# Patient Record
Sex: MALE | Race: WHITE | NOT HISPANIC OR LATINO | URBAN - METROPOLITAN AREA
[De-identification: names, ages, dates, MRNs, and addresses within clinical notes are randomized per-mention and may not be internally consistent; named-entity substitution may affect disease eponyms.]

---

## 2019-07-03 ENCOUNTER — EMERGENCY (EMERGENCY)
Facility: HOSPITAL | Age: 24
LOS: 0 days | Discharge: HOME | End: 2019-07-03
Admitting: EMERGENCY MEDICINE
Payer: COMMERCIAL

## 2019-07-03 VITALS
TEMPERATURE: 99 F | DIASTOLIC BLOOD PRESSURE: 56 MMHG | RESPIRATION RATE: 18 BRPM | HEART RATE: 67 BPM | SYSTOLIC BLOOD PRESSURE: 113 MMHG | OXYGEN SATURATION: 99 %

## 2019-07-03 VITALS — WEIGHT: 225.09 LBS

## 2019-07-03 DIAGNOSIS — J06.9 ACUTE UPPER RESPIRATORY INFECTION, UNSPECIFIED: ICD-10-CM

## 2019-07-03 DIAGNOSIS — Z79.899 OTHER LONG TERM (CURRENT) DRUG THERAPY: ICD-10-CM

## 2019-07-03 DIAGNOSIS — R09.81 NASAL CONGESTION: ICD-10-CM

## 2019-07-03 PROCEDURE — 99283 EMERGENCY DEPT VISIT LOW MDM: CPT

## 2019-07-03 RX ORDER — FLUTICASONE PROPIONATE 50 MCG
1 SPRAY, SUSPENSION NASAL
Qty: 1 | Refills: 0
Start: 2019-07-03 | End: 2019-07-09

## 2019-07-03 NOTE — ED PROVIDER NOTE - OBJECTIVE STATEMENT
25 y/o male presents to the ED c/o "I have a sore throat, nasal congestion, cough since yesterday." no fever/ chills/ weakness

## 2019-09-12 PROBLEM — Z78.9 OTHER SPECIFIED HEALTH STATUS: Chronic | Status: ACTIVE | Noted: 2019-07-03

## 2019-09-19 ENCOUNTER — OUTPATIENT (OUTPATIENT)
Dept: OUTPATIENT SERVICES | Facility: HOSPITAL | Age: 24
LOS: 1 days | Discharge: HOME | End: 2019-09-19

## 2019-09-19 DIAGNOSIS — M54.89 OTHER DORSALGIA: ICD-10-CM

## 2019-10-01 ENCOUNTER — OUTPATIENT (OUTPATIENT)
Dept: OUTPATIENT SERVICES | Facility: HOSPITAL | Age: 24
LOS: 1 days | Discharge: HOME | End: 2019-10-01

## 2019-11-13 DIAGNOSIS — M54.89 OTHER DORSALGIA: ICD-10-CM

## 2019-12-16 ENCOUNTER — EMERGENCY (EMERGENCY)
Facility: HOSPITAL | Age: 24
LOS: 0 days | Discharge: HOME | End: 2019-12-16
Admitting: EMERGENCY MEDICINE
Payer: OTHER MISCELLANEOUS

## 2019-12-16 VITALS — HEIGHT: 70 IN | WEIGHT: 235.01 LBS

## 2019-12-16 VITALS
HEART RATE: 72 BPM | DIASTOLIC BLOOD PRESSURE: 69 MMHG | TEMPERATURE: 98 F | SYSTOLIC BLOOD PRESSURE: 122 MMHG | OXYGEN SATURATION: 99 % | RESPIRATION RATE: 18 BRPM
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
## 2019-12-16 DIAGNOSIS — M25.579 PAIN IN UNSPECIFIED ANKLE AND JOINTS OF UNSPECIFIED FOOT: ICD-10-CM
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
## 2019-12-16 DIAGNOSIS — M25.572 PAIN IN LEFT ANKLE AND JOINTS OF LEFT FOOT: ICD-10-CM
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
## 2019-12-16 PROCEDURE — 73630 X-RAY EXAM OF FOOT: CPT | Mod: 26,LT

## 2019-12-16 PROCEDURE — 73610 X-RAY EXAM OF ANKLE: CPT | Mod: 26,LT

## 2019-12-16 PROCEDURE — 99283 EMERGENCY DEPT VISIT LOW MDM: CPT

## 2019-12-16 RX ORDER — IBUPROFEN 200 MG
600 TABLET ORAL ONCE
Refills: 0 | Status: COMPLETED | OUTPATIENT
Start: 2019-12-16 | End: 2019-12-16

## 2019-12-16 RX ADMIN — Medication 600 MILLIGRAM(S): at 08:37

## 2019-12-16 NOTE — ED ADULT TRIAGE NOTE - CHIEF COMPLAINT QUOTE
"I was bringing a patient back upstairs and my ankle (left) accidently got run over by the stretcher" No fall, no other injuries

## 2019-12-16 NOTE — ED ADULT NURSE NOTE - OBJECTIVE STATEMENT
as per patient, "I was bringing a patient back upstairs and my ankle (left) accidently got run over by the stretcher" pt denies falling. no head trauma

## 2019-12-16 NOTE — ED PROVIDER NOTE - PATIENT PORTAL LINK FT
You can access the FollowMyHealth Patient Portal offered by [facility] by registering at the following website: [email or web address]. By joining [employer]’s FollowMyHealth portal, you will also be able to view your health information using other applications (apps) compatible with our system.

## 2019-12-16 NOTE — ED PROVIDER NOTE - PHYSICAL EXAMINATION
CONSTITUTIONAL: Well-appearing; well-nourished; in no apparent distress.   NECK: Supple; non-tender; no cervical lymphadenopathy.   CARDIOVASCULAR: Normal S1, S2; no murmurs, rubs, or gallops.   RESPIRATORY: Normal chest excursion with respiration; breath sounds clear and equal bilaterally; no wheezes, rhonchi, or rales.  GI/: Normal bowel sounds; non-distended; non-tender; no palpable organomegaly.   MS: No evidence of trauma or deformity. + mild ttp to L midfoot. Normal ROM in all four extremities. distal pulses are normal.   SKIN: Normal for age and race; warm; No ecchymosis, abrasions, lacerations  NEURO/PSYCH: A & O x 4; grossly unremarkable. mood and manner are appropriate. Neurovascular intact. Sensation intact. Normal gait.

## 2019-12-16 NOTE — ED PROVIDER NOTE - NS ED ROS FT
Constitutional: no fever, chills, no recent weight loss, change in appetite or malaise  Eyes: no redness/discharge/pain/vision changes  ENT: no rhinorrhea/ear pain/sore throat  Cardiac: No chest pain, SOB or edema.  Respiratory: No cough or respiratory distress  GI: No nausea, vomiting, diarrhea or abdominal pain.  MS: + pain to L ankle. no loss of ROM, no weakness  Neuro: No headache or weakness. No LOC.  Skin: No skin rash, bruising, swelling  Except as documented in the HPI, all other systems are negative.

## 2019-12-16 NOTE — ED PROVIDER NOTE - OBJECTIVE STATEMENT
24 year old M no pmhx c/o pain to L ankle. 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 Denies any other injury. Pt has been ambulating well since. No bruising/swelling, decreased sensation, paresthesias, decreased rom, prior injuries/trauma, inability to ambulate.

## 2019-12-16 NOTE — ED PROVIDER NOTE - CARE PROVIDER_API CALL
Hung Jaimes)  Orthopaedic Surgery  [address] [place] [address]  Phone: [phone]  Fax: [phone]  Follow Up Time:

## 2020-04-26 ENCOUNTER — MESSAGE (OUTPATIENT)
Age: 25
End: 2020-04-26

## 2020-05-04 ENCOUNTER — APPOINTMENT (OUTPATIENT)
Dept: DISASTER EMERGENCY | Facility: HOSPITAL | Age: 25
End: 2020-05-04

## 2020-05-05 LAB
SARS-COV-2 IGG SERPL IA-ACNC: <0.1 INDEX
SARS-COV-2 IGG SERPL QL IA: NEGATIVE

## 2020-07-26 ENCOUNTER — EMERGENCY (EMERGENCY)
Facility: HOSPITAL | Age: 25
LOS: 0 days | Discharge: HOME | End: 2020-07-26
Attending: EMERGENCY MEDICINE | Admitting: EMERGENCY MEDICINE
Payer: OTHER MISCELLANEOUS

## 2020-07-26 VITALS
RESPIRATION RATE: 18 BRPM | WEIGHT: 235.01 LBS | SYSTOLIC BLOOD PRESSURE: 130 MMHG | HEART RATE: 63 BPM | OXYGEN SATURATION: 97 % | TEMPERATURE: 97 F | HEIGHT: 70 IN | DIASTOLIC BLOOD PRESSURE: 63 MMHG
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
## 2020-07-26 DIAGNOSIS — S09.90XA UNSPECIFIED INJURY OF HEAD, INITIAL ENCOUNTER: ICD-10-CM
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
## 2020-07-26 PROCEDURE — 99283 EMERGENCY DEPT VISIT LOW MDM: CPT

## 2020-07-26 NOTE — ED PROVIDER NOTE - PROGRESS NOTE DETAILS
ATTENDING NOTE: 24 y/o M with no PMHx presents with right sided head pain x2 days. Pt is an employee at the [employer] and while moving equipment, hit the side of his head against a rack. No LOC. No nausea or vomiting. No vision or hearing changes. No numbness, weakness or tingling. Pt has been ambulating normally since the incident. On exam: CON: ao x 3, HENMT: neck supple,  SKIN: no rash, MSK: no deformities, NEURO: no gross motor or sensory deficit. PERRL, EOMI. Normal gait.CN II-XII intact. 5/5 strength sensation intact. Finger to nose normal.  Psychiatric: appropriate mood, appropriate affect. Imp: closed head injury s/p 3 days ago. Tylenol and d/c home.

## 2020-07-26 NOTE — ED PROVIDER NOTE - PHYSICAL EXAMINATION
CONSTITUTIONAL: Well-developed; well-nourished; in no acute distress.   SKIN: warm, dry  HEAD: Normocephalic; tenderness to palpation of the R. occipital region. No overlying ecchymosis or abrasions.  EYES: PERRL, EOMI, no conjunctival erythema  ENT: No nasal discharge; airway clear.  NECK: Supple; non tender.  CARD: 2+ radial pulses B/L.   EXT: Normal ROM.  No clubbing, cyanosis or edema.   LYMPH: No acute cervical adenopathy.  NEURO: Alert, oriented. CN II - XII intact. No dysdiadochokinesia. Sensation grossly intact. Normal gait.   PSYCH: Cooperative, appropriate.

## 2020-07-26 NOTE — ED ADULT TRIAGE NOTE - BP NONINVASIVE DIASTOLIC (MM HG)
Psychiatric/Behavioral: Negative for depression and suicidal ideas. PHYSICAL EXAM:  Temp 97.8 °F (36.6 °C) (Temporal)   Ht 5' 5\" (1.651 m)   Wt 122 lb (55.3 kg)   BMI 20.30 kg/m²   Physical Exam   Constitutional: No distress. HENT:   Mouth/Throat: No oropharyngeal exudate. Eyes: Left eye exhibits no discharge. No scleral icterus. Neck: No JVD present. No tracheal deviation present. No thyromegaly present. Cardiovascular: Exam reveals no gallop and no friction rub. Pulmonary/Chest: No stridor. He has no rales. Abdominal: He exhibits no distension and no mass. There is no rebound and no guarding. Musculoskeletal: He exhibits no edema. Neurological: No cranial nerve deficit. He exhibits normal muscle tone. Coordination normal.   Skin: He is not diaphoretic. No pallor. DATA:  U/A:    Lab Results   Component Value Date    COLORU YELLOW 2018    PROTEINU 100 2018    PHUR 7.5 2018    WBCUA 6-10 2018    RBCUA 21-30 2018    MUCUS Rare 2018    BACTERIA 2+ 2018    CLARITYU CLOUDY 2018    SPECGRAV 1.017 2018    LEUKOCYTESUR SMALL 2018    UROBILINOGEN 0.2 2018    BILIRUBINUR Negative 2018    BLOODU LARGE 2018    GLUCOSEU Negative 2018    AMORPHOUS 1+ 2018          Imagin. Urinary retention  His catheter was removed 200 mL of normal saline was inserted he voided at least 185 mL out he said he had an urge to void and felt like he emptied his bladder well. We'll leave catheter out he will follow-up in 2 weeks. Of course we go several hours without voiding and starts having discomfort he should either return here or go to the nearest ER if after hours. - [place] IRRIGATION OF BLADDER    2. Benign prostatic hyperplasia with urinary obstruction  I wanted to take 2 Flomax daily. 1. He will follow-up in 2 weeks to see how he is doing.       Orders Placed This Encounter   Procedures    [place] IRRIGATION OF BLADDER     Fill and pull     No orders of the defined types were placed in this encounter. Plan:  Patient will return in 2 weeks. 65

## 2020-07-26 NOTE — ED PROVIDER NOTE - OBJECTIVE STATEMENT
25y M w/ no sig PMH presents with head pain. States hit his head at work while turning around too fast on the R. side of his head. No LOC. Has been having throbbing pain whenever he palpates the area. Improved with rest. No worsening symptoms. Denies headache, visual changes, hearing changes, n/v, bleeding, or numbness/tingling.

## 2020-07-26 NOTE — ED PROVIDER NOTE - PATIENT PORTAL LINK FT
You can access the FollowMyHealth Patient Portal offered by [facility] by registering at the following website: [email or web address]. By joining [employer]’s FollowMyHealth portal, you will also be able to view your health information using other applications (apps) compatible with our system.

## 2021-10-17 NOTE — ED PROVIDER NOTE - NS_EDPROVIDERDISPOUSERTYPE_ED_A_ED
I have personally evaluated and examined the patient. The Attending was available to me as a supervising provider if needed.
1

## 2024-01-01 NOTE — ED PROVIDER NOTE - NS HIV RISK FACTOR YES
For more information on Synagis risk factors, visit: https://publications.aap.org/redbook/book/347/chapter/7386658/Respiratory-Syncytial-Virus
Declined

## 2025-07-17 NOTE — ED PROVIDER NOTE - MUSCULOSKELETAL, MLM
Diagnostic wire removed. 260 J wire Spine appears normal, range of motion is not limited, no muscle or joint tenderness